# Patient Record
Sex: FEMALE | URBAN - METROPOLITAN AREA
[De-identification: names, ages, dates, MRNs, and addresses within clinical notes are randomized per-mention and may not be internally consistent; named-entity substitution may affect disease eponyms.]

---

## 2017-06-30 ENCOUNTER — HISTORICAL (OUTPATIENT)
Dept: HEPATOLOGY | Facility: HOSPITAL | Age: 67
End: 2017-06-30

## 2017-07-18 ENCOUNTER — HISTORICAL (OUTPATIENT)
Dept: PREADMISSION TESTING | Facility: HOSPITAL | Age: 67
End: 2017-07-18

## 2017-07-18 LAB
BUN SERPL-MCNC: 14 MG/DL (ref 7–18)
CALCIUM SERPL-MCNC: 9 MG/DL (ref 8.5–10.1)
CHLORIDE SERPL-SCNC: 102 MMOL/L (ref 98–107)
CO2 SERPL-SCNC: 31 MMOL/L (ref 21–32)
CREAT SERPL-MCNC: 0.78 MG/DL (ref 0.55–1.02)
EST. AVERAGE GLUCOSE BLD GHB EST-MCNC: 157 MG/DL
GLUCOSE SERPL-MCNC: 119 MG/DL (ref 74–106)
HBA1C MFR BLD: 7.1 % (ref 4.2–6.3)
POTASSIUM SERPL-SCNC: 4.7 MMOL/L (ref 3.5–5.1)
SODIUM SERPL-SCNC: 141 MMOL/L (ref 136–145)

## 2017-07-20 ENCOUNTER — HISTORICAL (OUTPATIENT)
Dept: SURGERY | Facility: HOSPITAL | Age: 67
End: 2017-07-20

## 2017-10-27 ENCOUNTER — HISTORICAL (OUTPATIENT)
Dept: RADIOLOGY | Facility: HOSPITAL | Age: 67
End: 2017-10-27

## 2017-11-30 ENCOUNTER — HISTORICAL (OUTPATIENT)
Dept: ADMINISTRATIVE | Facility: HOSPITAL | Age: 67
End: 2017-11-30

## 2017-11-30 LAB
ALBUMIN SERPL-MCNC: 3.9 GM/DL (ref 3.4–5)
ALBUMIN/GLOB SERPL: 1.2 RATIO (ref 1.1–2)
ALP SERPL-CCNC: 94 UNIT/L (ref 38–126)
ALT SERPL-CCNC: 44 UNIT/L (ref 12–78)
AST SERPL-CCNC: 23 UNIT/L (ref 15–37)
BILIRUB SERPL-MCNC: 0.4 MG/DL (ref 0.2–1)
BILIRUBIN DIRECT+TOT PNL SERPL-MCNC: 0.1 MG/DL (ref 0–0.5)
BILIRUBIN DIRECT+TOT PNL SERPL-MCNC: 0.3 MG/DL (ref 0–0.8)
BUN SERPL-MCNC: 13 MG/DL (ref 7–18)
CALCIUM SERPL-MCNC: 9.2 MG/DL (ref 8.5–10.1)
CHLORIDE SERPL-SCNC: 105 MMOL/L (ref 98–107)
CHOLEST SERPL-MCNC: 207 MG/DL (ref 0–200)
CHOLEST/HDLC SERPL: 4.3 {RATIO} (ref 0–4)
CO2 SERPL-SCNC: 30 MMOL/L (ref 21–32)
CREAT SERPL-MCNC: 0.86 MG/DL (ref 0.55–1.02)
EST. AVERAGE GLUCOSE BLD GHB EST-MCNC: 154 MG/DL
GLOBULIN SER-MCNC: 3.3 GM/DL (ref 2.4–3.5)
GLUCOSE SERPL-MCNC: 160 MG/DL (ref 74–106)
HBA1C MFR BLD: 7 % (ref 4.2–6.3)
HDLC SERPL-MCNC: 48 MG/DL (ref 35–60)
LDLC SERPL CALC-MCNC: 131 MG/DL (ref 0–129)
POTASSIUM SERPL-SCNC: 5.3 MMOL/L (ref 3.5–5.1)
PROT SERPL-MCNC: 7.2 GM/DL (ref 6.4–8.2)
SODIUM SERPL-SCNC: 140 MMOL/L (ref 136–145)
TRIGL SERPL-MCNC: 139 MG/DL (ref 30–150)
VLDLC SERPL CALC-MCNC: 28 MG/DL

## 2018-06-21 ENCOUNTER — HISTORICAL (OUTPATIENT)
Dept: ADMINISTRATIVE | Facility: HOSPITAL | Age: 68
End: 2018-06-21

## 2018-07-11 ENCOUNTER — HISTORICAL (OUTPATIENT)
Dept: RADIOLOGY | Facility: HOSPITAL | Age: 68
End: 2018-07-11

## 2018-08-21 ENCOUNTER — HISTORICAL (OUTPATIENT)
Dept: LAB | Facility: HOSPITAL | Age: 68
End: 2018-08-21

## 2018-08-21 LAB
ABS NEUT (OLG): 4.92 X10(3)/MCL (ref 2.1–9.2)
ALBUMIN SERPL-MCNC: 3.6 GM/DL (ref 3.4–5)
ALBUMIN/GLOB SERPL: 1.2 RATIO (ref 1.1–2)
ALP SERPL-CCNC: 83 UNIT/L (ref 38–126)
ALT SERPL-CCNC: 31 UNIT/L (ref 12–78)
AST SERPL-CCNC: 19 UNIT/L (ref 15–37)
BASOPHILS # BLD AUTO: 0 X10(3)/MCL (ref 0–0.2)
BASOPHILS NFR BLD AUTO: 0 %
BILIRUB SERPL-MCNC: 0.3 MG/DL (ref 0.2–1)
BILIRUBIN DIRECT+TOT PNL SERPL-MCNC: 0.1 MG/DL (ref 0–0.5)
BILIRUBIN DIRECT+TOT PNL SERPL-MCNC: 0.2 MG/DL (ref 0–0.8)
BUN SERPL-MCNC: 15 MG/DL (ref 7–18)
CALCIUM SERPL-MCNC: 9.1 MG/DL (ref 8.5–10.1)
CHLORIDE SERPL-SCNC: 102 MMOL/L (ref 98–107)
CHOLEST SERPL-MCNC: 204 MG/DL (ref 0–200)
CHOLEST/HDLC SERPL: 4.7 {RATIO} (ref 0–4)
CO2 SERPL-SCNC: 32 MMOL/L (ref 21–32)
CREAT SERPL-MCNC: 0.83 MG/DL (ref 0.55–1.02)
CREAT UR-MCNC: 212 MG/DL
EOSINOPHIL # BLD AUTO: 0.2 X10(3)/MCL (ref 0–0.9)
EOSINOPHIL NFR BLD AUTO: 3 %
ERYTHROCYTE [DISTWIDTH] IN BLOOD BY AUTOMATED COUNT: 13.7 % (ref 11.5–17)
EST. AVERAGE GLUCOSE BLD GHB EST-MCNC: 126 MG/DL
GLOBULIN SER-MCNC: 3 GM/DL (ref 2.4–3.5)
GLUCOSE SERPL-MCNC: 155 MG/DL (ref 74–106)
HBA1C MFR BLD: 6 % (ref 4.2–6.3)
HCT VFR BLD AUTO: 37.7 % (ref 37–47)
HDLC SERPL-MCNC: 43 MG/DL (ref 35–60)
HGB BLD-MCNC: 11.9 GM/DL (ref 12–16)
LDLC SERPL CALC-MCNC: 125 MG/DL (ref 0–129)
LYMPHOCYTES # BLD AUTO: 2 X10(3)/MCL (ref 0.6–4.6)
LYMPHOCYTES NFR BLD AUTO: 26 %
MCH RBC QN AUTO: 29.3 PG (ref 27–31)
MCHC RBC AUTO-ENTMCNC: 31.6 GM/DL (ref 33–36)
MCV RBC AUTO: 92.9 FL (ref 80–94)
MICROALBUMIN UR-MCNC: 1.4 MG/DL
MICROALBUMIN/CREAT RATIO PNL UR: 6.6 MG/GM CR (ref 0–30)
MONOCYTES # BLD AUTO: 0.6 X10(3)/MCL (ref 0.1–1.3)
MONOCYTES NFR BLD AUTO: 8 %
NEUTROPHILS # BLD AUTO: 4.92 X10(3)/MCL (ref 1.4–7.9)
NEUTROPHILS NFR BLD AUTO: 63 %
PLATELET # BLD AUTO: 226 X10(3)/MCL (ref 130–400)
PMV BLD AUTO: 10.3 FL (ref 9.4–12.4)
POTASSIUM SERPL-SCNC: 4.8 MMOL/L (ref 3.5–5.1)
PROT SERPL-MCNC: 6.6 GM/DL (ref 6.4–8.2)
RBC # BLD AUTO: 4.06 X10(6)/MCL (ref 4.2–5.4)
SODIUM SERPL-SCNC: 140 MMOL/L (ref 136–145)
TRIGL SERPL-MCNC: 179 MG/DL (ref 30–150)
VLDLC SERPL CALC-MCNC: 36 MG/DL
WBC # SPEC AUTO: 7.9 X10(3)/MCL (ref 4.5–11.5)

## 2019-03-12 ENCOUNTER — HISTORICAL (OUTPATIENT)
Dept: LAB | Facility: HOSPITAL | Age: 69
End: 2019-03-12

## 2019-03-12 LAB
ABS NEUT (OLG): 4.36 X10(3)/MCL (ref 2.1–9.2)
ALBUMIN SERPL-MCNC: 4 GM/DL (ref 3.4–5)
ALBUMIN/GLOB SERPL: 1.1 {RATIO}
ALP SERPL-CCNC: 85 UNIT/L (ref 38–126)
ALT SERPL-CCNC: 28 UNIT/L (ref 12–78)
AST SERPL-CCNC: 21 UNIT/L (ref 15–37)
BASOPHILS # BLD AUTO: 0.1 X10(3)/MCL (ref 0–0.2)
BASOPHILS NFR BLD AUTO: 1 %
BILIRUB SERPL-MCNC: 0.5 MG/DL (ref 0.2–1)
BILIRUBIN DIRECT+TOT PNL SERPL-MCNC: 0.1 MG/DL (ref 0–0.2)
BILIRUBIN DIRECT+TOT PNL SERPL-MCNC: 0.4 MG/DL (ref 0–0.8)
BUN SERPL-MCNC: 15 MG/DL (ref 7–18)
CALCIUM SERPL-MCNC: 9.2 MG/DL (ref 8.5–10.1)
CHLORIDE SERPL-SCNC: 102 MMOL/L (ref 98–107)
CHOLEST SERPL-MCNC: 212 MG/DL (ref 0–200)
CHOLEST/HDLC SERPL: 3.5 {RATIO} (ref 0–4)
CO2 SERPL-SCNC: 32 MMOL/L (ref 21–32)
CREAT SERPL-MCNC: 0.87 MG/DL (ref 0.55–1.02)
DEPRECATED CALCIDIOL+CALCIFEROL SERPL-MC: 25.61 NG/ML (ref 30–80)
EOSINOPHIL # BLD AUTO: 0.2 X10(3)/MCL (ref 0–0.9)
EOSINOPHIL NFR BLD AUTO: 2 %
ERYTHROCYTE [DISTWIDTH] IN BLOOD BY AUTOMATED COUNT: 13.5 % (ref 11.5–17)
EST. AVERAGE GLUCOSE BLD GHB EST-MCNC: 154 MG/DL
GLOBULIN SER-MCNC: 3.6 GM/DL (ref 2.4–3.5)
GLUCOSE SERPL-MCNC: 147 MG/DL (ref 74–106)
HBA1C MFR BLD: 7 % (ref 4.2–6.3)
HCT VFR BLD AUTO: 43.3 % (ref 37–47)
HDLC SERPL-MCNC: 60 MG/DL (ref 35–60)
HGB BLD-MCNC: 13.7 GM/DL (ref 12–16)
LDLC SERPL CALC-MCNC: 130 MG/DL (ref 0–129)
LYMPHOCYTES # BLD AUTO: 2.4 X10(3)/MCL (ref 0.6–4.6)
LYMPHOCYTES NFR BLD AUTO: 31 %
MCH RBC QN AUTO: 29.5 PG (ref 27–31)
MCHC RBC AUTO-ENTMCNC: 31.6 GM/DL (ref 33–36)
MCV RBC AUTO: 93.3 FL (ref 80–94)
MONOCYTES # BLD AUTO: 0.6 X10(3)/MCL (ref 0.1–1.3)
MONOCYTES NFR BLD AUTO: 8 %
NEUTROPHILS # BLD AUTO: 4.36 X10(3)/MCL (ref 2.1–9.2)
NEUTROPHILS NFR BLD AUTO: 58 %
PLATELET # BLD AUTO: 321 X10(3)/MCL (ref 130–400)
PMV BLD AUTO: 10.7 FL (ref 9.4–12.4)
POTASSIUM SERPL-SCNC: 4.3 MMOL/L (ref 3.5–5.1)
PROGEST SERPL-MCNC: <0.1 NG/ML
PROT SERPL-MCNC: 7.6 GM/DL (ref 6.4–8.2)
RBC # BLD AUTO: 4.64 X10(6)/MCL (ref 4.2–5.4)
SODIUM SERPL-SCNC: 139 MMOL/L (ref 136–145)
T3FREE SERPL-MCNC: 2.64 PG/ML (ref 2.18–3.98)
T4 FREE SERPL-MCNC: 0.86 NG/DL (ref 0.76–1.46)
TRIGL SERPL-MCNC: 112 MG/DL (ref 30–150)
TSH SERPL-ACNC: 2.58 MIU/L (ref 0.36–3.74)
VLDLC SERPL CALC-MCNC: 22 MG/DL
WBC # SPEC AUTO: 7.6 X10(3)/MCL (ref 4.5–11.5)

## 2019-08-15 ENCOUNTER — HISTORICAL (OUTPATIENT)
Dept: LAB | Facility: HOSPITAL | Age: 69
End: 2019-08-15

## 2019-08-15 LAB
CREAT UR-MCNC: 144 MG/DL
EST. AVERAGE GLUCOSE BLD GHB EST-MCNC: 137 MG/DL
HBA1C MFR BLD: 6.4 % (ref 4.2–6.3)
MICROALBUMIN UR-MCNC: 1.3 MG/DL
MICROALBUMIN/CREAT RATIO PNL UR: 9 MG/GM CR (ref 0–30)

## 2019-08-21 ENCOUNTER — HISTORICAL (OUTPATIENT)
Dept: CARDIOLOGY | Facility: HOSPITAL | Age: 69
End: 2019-08-21

## 2019-09-13 ENCOUNTER — HISTORICAL (OUTPATIENT)
Dept: LAB | Facility: HOSPITAL | Age: 69
End: 2019-09-13

## 2019-09-13 LAB
CREAT UR-MCNC: 187 MG/DL
MICROALBUMIN UR-MCNC: 1.3 MG/DL
MICROALBUMIN/CREAT RATIO PNL UR: 7 MG/GM CR (ref 0–30)

## 2019-10-02 ENCOUNTER — HISTORICAL (OUTPATIENT)
Dept: ADMINISTRATIVE | Facility: HOSPITAL | Age: 69
End: 2019-10-02

## 2020-02-26 LAB
BUN SERPL-MCNC: 17 MG/DL (ref 7–18)
CALCIUM SERPL-MCNC: 9.2 MG/DL (ref 8.5–10.1)
CHLORIDE SERPL-SCNC: 102 MMOL/L (ref 98–107)
CO2 SERPL-SCNC: 31 MMOL/L (ref 21–32)
CREAT SERPL-MCNC: 0.86 MG/DL (ref 0.55–1.02)
CREAT/UREA NIT SERPL: 20
EST. AVERAGE GLUCOSE BLD GHB EST-MCNC: 154 MG/DL
GLUCOSE SERPL-MCNC: 126 MG/DL (ref 74–106)
HBA1C MFR BLD: 7 % (ref 4.2–6.3)
POTASSIUM SERPL-SCNC: 3.6 MMOL/L (ref 3.5–5.1)
SODIUM SERPL-SCNC: 138 MMOL/L (ref 136–145)

## 2020-03-12 ENCOUNTER — HISTORICAL (OUTPATIENT)
Dept: SURGERY | Facility: HOSPITAL | Age: 70
End: 2020-03-12

## 2021-07-29 ENCOUNTER — HISTORICAL (OUTPATIENT)
Dept: ADMINISTRATIVE | Facility: HOSPITAL | Age: 71
End: 2021-07-29

## 2021-07-29 LAB — SARS-COV-2 AG RESP QL IA.RAPID: NEGATIVE

## 2022-04-10 ENCOUNTER — HISTORICAL (OUTPATIENT)
Dept: ADMINISTRATIVE | Facility: HOSPITAL | Age: 72
End: 2022-04-10

## 2022-04-26 VITALS
WEIGHT: 171.31 LBS | SYSTOLIC BLOOD PRESSURE: 104 MMHG | OXYGEN SATURATION: 97 % | DIASTOLIC BLOOD PRESSURE: 61 MMHG | BODY MASS INDEX: 27.53 KG/M2 | HEIGHT: 66 IN

## 2022-04-30 NOTE — OP NOTE
DATE OF SURGERY:    07/20/2017    SURGEON:  JENNIFER Mancini MD    ASSISTANT:  Mario Mendez, Certified first assistant    PREOPERATIVE DIAGNOSIS:  Right rotator cuff impingement tendonitis with rotator cuff tear.    POSTOPERATIVE DIAGNOSIS:  Right rotator cuff impingement tendonitis with rotator cuff tear.    OPERATIVE PROCEDURE:  Right subacromial decompression with rotator cuff repair.    INDICATION FOR PROCEDURE:  This 66-year-old had the above mentioned problem.  She had failed conservative therapy and desired operative intervention. The risks and benefits of the proposed and alternative treatment were explained to the patient.  Questions were solicited and answered.  No assurance given.  Inform consent was obtained.    PROCEDURE IN DETAIL:  After appropriate operative consents were obtained, patient was brought to the operating room, placed on the operating table in supine position. Preoperatively a scalene block had been introduced without difficulty.  Intraoperatively, general endotracheal anesthesia was induced without difficulty.  The patient's head was placed on a Ruff head rest and bed was placed in a modified Jerome's position.  The skin on the right arm and shoulder were prepped and draped in a sterile manner using ChloraPrep.  The patient had a latex allergy and all latex precautions were followed.  Standard approach to AC joint was done, a stay stitch was placed in the 2% deltoid splitting.  A small amount of deltoid was taken down anterior to the AC joint.  Patient had extremely thickened bursa which was removed.  Patient had a very small subacromial space.  A subacromial decompression was done and the edges were smooth with a Darrach retractor.  Next, rotator cuff was evaluated, patient did have a full thickness simple tear at the supraspinatus infraspinatus junction.  The free edges were debrided.  Next, using the Quattro link knotless anchor 5.5 millimeter Peak cap was used.   The free edges of the cuff were grasped with suture using the Scorpion suture passer.  Tails of the suture were passed through the anchor and the anchor was impacted down to the first line.  The rotator cuff was then tensioned to the appropriate position and the anchor was then impacted the remainder of the way.  This gave an excellent repair of the rotator cuff.  The shoulder was taken through a range of motion, repair was found to be stable in all planes.  The wound was thoroughly irrigated, all bleeders coagulated.  The deltoid was closed and reattached and the wound was then closed in layered fashion with subcuticular stitch on the skin.  Sterile dressings were applied and a compressive dressing was applied.         Lap count, sponge count correct X2.  Estimated blood loss was minimal.  Patient tolerated the procedure without difficulty and was transferred to recovery room in stable condition.        ______________________________  M. MD DEBRA Guillory/OMID  DD:  07/20/2017  Time:  01:56PM  DT:  07/21/2017  Time:  11:09AM  Job #:  [id number]

## 2022-04-30 NOTE — OP NOTE
DATE OF SURGERY:    03/12/2020    SURGEON:  JENNIFER Mancini MD    PREOPERATIVE DIAGNOSIS:  Mucocele, left 3rd finger.    POSTOPERATIVE DIAGNOSIS:  Mucocele, left 3rd finger.  Retained foreign body, left 3rd finger.    OPERATIVE PROCEDURE:  Irrigation and debridement, removal of foreign body and mucocele from left 3rd finger.    INDICATION FOR OPERATION:  This 69-year-old had the above-mentioned problems.  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  She had previously had a DIP fusion by another surgeon.  The patient desired operative intervention for her recurrent problem.  The risks and benefits of the proposed and alternative treatment were explained to the patient.  Questions were solicited and answered.  No assurance given.  Informed consent was obtained.    OPERATION IN DETAIL:  After appropriate operative consents were obtained, patient was taken to the operating room.  Axillary block had been induced by Anesthesia without difficulty.  Skin on the left arm was prepped and draped in a sterile manner using ChloraPrep.  After exsanguination with an Esmarch bandage, a previously placed arm tourniquet was elevated.  There was a small dark punctate hole at the area of her mucocele.  A hockey-stick-shaped incision was made over the DIP joint in this area.  Careful dissection was done.  Mucocele was removed, and I found a retained piece of nylon suture underneath the skin that was the source of the mucocele.  This area was thoroughly cleaned and irrigated.  All bleeders were coagulated, and the wound was closed with interrupted nylon sutures.  Sterile dressings were applied, and a compressive dressing was applied.     Lap count and sponge count were correct x2.  Estimated blood loss was 3 cc.  Patient tolerated the procedure without difficulty and was transferred to One-Day Stay in stable condition.        ______________________________  JENNIFER Mancini MD    DEBRA/NIRAV  DD:  03/12/2020   Time:  07:23AM  DT:  03/12/2020  Time:  07:29AM  Job #:  [id number]

## 2025-06-12 DIAGNOSIS — S32.9XXA PELVIC FRACTURE: Primary | ICD-10-CM

## 2025-06-17 ENCOUNTER — OFFICE VISIT (OUTPATIENT)
Dept: ORTHOPEDICS | Facility: CLINIC | Age: 75
End: 2025-06-17
Payer: MEDICARE

## 2025-06-17 VITALS
HEIGHT: 66 IN | DIASTOLIC BLOOD PRESSURE: 67 MMHG | WEIGHT: 155 LBS | BODY MASS INDEX: 24.91 KG/M2 | HEART RATE: 98 BPM | SYSTOLIC BLOOD PRESSURE: 110 MMHG

## 2025-06-17 DIAGNOSIS — S32.301A CLOSED NONDISPLACED FRACTURE OF RIGHT ILIUM, UNSPECIFIED FRACTURE MORPHOLOGY, INITIAL ENCOUNTER: ICD-10-CM

## 2025-06-17 PROCEDURE — 27197 CLSD TX PELVIC RING FX: CPT | Mod: ,,, | Performed by: ORTHOPAEDIC SURGERY

## 2025-06-17 PROCEDURE — 99203 OFFICE O/P NEW LOW 30 MIN: CPT | Mod: 25,,, | Performed by: ORTHOPAEDIC SURGERY

## 2025-06-17 RX ORDER — DIAPER,BRIEF,ADULT, DISPOSABLE
500 EACH MISCELLANEOUS DAILY
COMMUNITY

## 2025-06-17 RX ORDER — DEXTROAMPHETAMINE SACCHARATE, AMPHETAMINE ASPARTATE, DEXTROAMPHETAMINE SULFATE AND AMPHETAMINE SULFATE 7.5; 7.5; 7.5; 7.5 MG/1; MG/1; MG/1; MG/1
1 TABLET ORAL 2 TIMES DAILY
COMMUNITY

## 2025-06-17 RX ORDER — CLONIDINE HYDROCHLORIDE 0.1 MG/1
TABLET ORAL
COMMUNITY
Start: 2025-04-16

## 2025-06-17 RX ORDER — TRAZODONE HYDROCHLORIDE 50 MG/1
100 TABLET ORAL NIGHTLY
COMMUNITY
Start: 2025-04-16

## 2025-06-17 RX ORDER — SEMAGLUTIDE 1.34 MG/ML
INJECTION, SOLUTION SUBCUTANEOUS
COMMUNITY

## 2025-06-17 RX ORDER — GABAPENTIN 100 MG/1
200 CAPSULE ORAL 2 TIMES DAILY
COMMUNITY

## 2025-06-17 RX ORDER — MONTELUKAST SODIUM 10 MG/1
10 TABLET ORAL
COMMUNITY

## 2025-06-17 RX ORDER — GLIMEPIRIDE 4 MG/1
4 TABLET ORAL 2 TIMES DAILY
COMMUNITY

## 2025-06-17 RX ORDER — CITALOPRAM 40 MG/1
40 TABLET ORAL
COMMUNITY

## 2025-06-17 RX ORDER — BUPROPION HYDROCHLORIDE 300 MG/1
300 TABLET ORAL
COMMUNITY

## 2025-06-17 RX ORDER — LOSARTAN POTASSIUM 100 MG/1
100 TABLET ORAL
COMMUNITY

## 2025-06-17 RX ORDER — ATORVASTATIN CALCIUM 10 MG/1
10 TABLET, FILM COATED ORAL DAILY
COMMUNITY

## 2025-06-17 RX ORDER — ARIPIPRAZOLE 5 MG/1
5 TABLET ORAL
COMMUNITY

## 2025-06-17 RX ORDER — ACETAMINOPHEN, DEXTROMETHORPHAN HYDROBROMIDE, DOXYLAMINE SUCCINATE, PHENYLEPHRINE HYDROCHLORIDE 650; 20; 12.5; 1 MG/30ML; MG/30ML; MG/30ML; MG/30ML
SOLUTION ORAL
COMMUNITY

## 2025-06-17 NOTE — PROGRESS NOTES
"Subjective:       Patient ID: Terese Nieves is a 74 y.o. female.  Chief Complaint   Patient presents with    Pelvis - Injury     Pelvis fx, DOI: 6/2/25, at the top bunk coming down the ladder fell backwards, present with her sister       HPI:  History of Present Illness    HPI:  Ms. Nieves presents for follow-up after a recent fall resulting in pelvic fractures. She reports pain in the front of her pelvis, describing it as severe but not at maximum intensity. Pain is exacerbated by walking. She is currently using a cane for ambulation and has a wheelchair available. She denies any subsequent falls since the initial incident or pain in the front of her pelvis other than the main area of concern.    She also reports pain in both shoulders, which she attributes to the impact of the fall. Pain occurs when reaching for objects.    For pain management, she is currently taking Norco (hydrocodone).    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    Her medical history is significant for extensive back surgery in 2002, which included a "global fusion" with incisions both anteriorly and posteriorly.    IMAGING:  A CT of the pelvis confirmed fractures. The radiologist identified two fractures, with a possible third. The orthopedic surgeon confirmed one definite fracture of the inferior pubic ramus.    MEDICATIONS:  Ms. Nieves is on [place] (hydrocodone) for pain management.    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 She is currently concerned about her ability to return to work due to a recent injury. This provider believes the patient will be able to return to teaching by the new year, though she may be slower initially. She is expected to have noticeable improvement in 6 weeks, allowing her to return to work.      ROS:  Genitourinary: +pelvic pain  Musculoskeletal: +joint pain, +pain with " "movement, +difficulty walking          ROS:  Constitutional: Denies fever chills  Eyes: No change in vision  ENT: No ringing or current infections  CV: No chest pain  Resp: No labored breathing  MSK: Pain evident at site of injury located in HPI,   Integ: No signs of abrasions or lacerations  Neuro: No numbness or tingling  Lymphatic: No swelling outside the area of injury     Medications Ordered Prior to Encounter[1]       Objective:      /67 (BP Location: Left arm, Patient Position: Sitting)   Pulse 98   Ht 5' 6" (1.676 m)   Wt 70.3 kg (155 lb)   BMI 25.02 kg/m²   General the patient is alert and oriented x3 no acute distress nontoxic-appearing appropriate affect.    Constitutional: Vital signs are examined and stable.  Resp: No signs of labored breathing                        RLE: -Skin:No signs of new abrasions or lacerations, no scars           -MSK: : EHL/FHL, Gastroc/Tib anterior Strength 5/5           -Neuro:  Sensation intact to light touch L3-S1 dermatomes           -Lymphatic: No signs of lymphadenopathy           -CV: Capillary refill is less than 2 seconds. DP/PT pulses  2/4. Compartments soft and compressible.   Body mass index is 25.02 kg/m².  Ideal body weight: 59.3 kg (130 lb 11.7 oz)  Adjusted ideal body weight: 63.7 kg (140 lb 7 oz)  Hemoglobin A1c   Date Value Ref Range Status   02/26/2020 7.0 (H) 4.2 - 6.3 % Final   08/15/2019 6.4 (H) 4.2 - 6.3 % Final     Hgb   Date Value Ref Range Status   03/12/2019 13.7 12.0 - 16.0 gm/dL Final   08/21/2018 11.9 (L) 12.0 - 16.0 gm/dL Final     Vitamin D   Date Value Ref Range Status   03/12/2019 25.61 (L) 30.00 - 80.00 ng/mL Final     WBC   Date Value Ref Range Status   03/12/2019 7.6 4.5 - 11.5 x10(3)/mcL Final   08/21/2018 7.9 4.5 - 11.5 x10(3)/mcL Final       Radiology:  CT pelvis showing a right LC 1 pelvic ring fracture minimal displacement.  X-rays today show minimal displacement of the pelvic ring fracture        Assessment:         1. " "Pelvic fracture  Ambulatory referral/consult to Orthopedics    Ambulatory Referral/Consult to Physical Therapy              Plan:         No follow-ups on file.    Terese Bentley" was seen today for injury.    Diagnoses and all orders for this visit:    Pelvic fracture  -     Ambulatory referral/consult to Orthopedics  -     Ambulatory Referral/Consult to Physical Therapy; Future      Assessment & Plan    PLAN SUMMARY:  - XR ordered to check healing progress  - Referred to physical therapy  - Continue weight bearing as tolerated  - Norco (hydrocodone) prescribed for pain for 2 weeks  - Use cane for walking assistance if needed  - Avoid excessive wheelchair use  - Follow-up in a few weeks for follow-up and XRs, with ~2 more total visits planned    FOLLOW UP:  - Follow up in a few weeks for follow-up and XRs.  - Follow up ~2 more times total.    MEDICATIONS:  - Norco (hydrocodone) for pain for a few weeks.    REFERRALS:  - Referred to PT.    IMAGING:  - Ordered XR for follow-up visit to check healing progress.    PATIENT INSTRUCTIONS:  - Continue weight bearing as tolerated.  - Use cane for walking assistance if needed.  - Avoid using wheelchair excessively.          Patient has been LC 1 pelvic ring fracture small sacral ala fracture of the right.  She is ambulatory at home.  She is taking care of herself.  She teaches math and care ingrowth.  She is weight-bearing as tolerated.  We will order her pain medication when she calls.  Suspect she will make a full recovery without surgery.  We will order fracture care today.      This note/OR report was created with the assistance of  voice recognition software or phone  dictation.  There may be transcription errors as a result of using this technology however minimal. Effort has been made to assure accuracy of transcription but any obvious errors or omissions should be clarified with the author of the document.     This note was generated with the assistance of Beegit " listening technology. Verbal consent was obtained by the patient and accompanying visitor(s) for the recording of patient appointment to facilitate this note. I attest to having reviewed and edited the generated note for accuracy, though some syntax or spelling errors may persist. Please contact the author of this note for any clarification.       Tyson Lewis DO  Orthopedic Trauma Surgery  06/17/2025      Future Appointments   Date Time Provider Department Center   7/9/2025  9:45 AM Tyson Lewis DO [facility] [place]                   [1]   Current Outpatient Medications on File Prior to Visit   Medication Sig Dispense Refill    ARIPiprazole (ABILIFY) 5 MG Tab Take 5 mg by mouth.      atorvastatin (LIPITOR) 10 MG tablet Take 10 mg by mouth once daily.      buPROPion (WELLBUTRIN XL) 300 MG 24 hr tablet Take 300 mg by mouth.      citalopram (CELEXA) 40 MG tablet Take 40 mg by mouth.      cloNIDine (CATAPRES) 0.1 MG tablet 1-2 q hs      dextroamphetamine-amphetamine 30 mg Tab Take 1 tablet by mouth 2 (two) times daily.      gabapentin (NEURONTIN) 100 MG capsule Take 200 mg by mouth 2 (two) times daily.      glimepiride (AMARYL) 4 MG tablet Take 4 mg by mouth 2 (two) times daily.      Lactobacillus rhamnosus GG (CULTURELLE) 10 billion cell capsule Take 1 capsule by mouth once daily.      losartan (COZAAR) 100 MG tablet Take 100 mg by mouth.      lysine (L-LYSINE) 500 mg Tab Take 500 mg by mouth once daily.      montelukast (SINGULAIR) 10 mg tablet Take 10 mg by mouth.      semaglutide (OZEMPIC) 1 mg/dose (4 mg/3 mL) Inject into the skin every 7 days.      traZODone (DESYREL) 50 MG tablet Take 100 mg by mouth every evening.      turmeric root extract 1,053 mg Tab Take by mouth.       No current facility-administered medications on file prior to visit.

## 2025-06-20 DIAGNOSIS — S32.301A CLOSED NONDISPLACED FRACTURE OF RIGHT ILIUM, UNSPECIFIED FRACTURE MORPHOLOGY, INITIAL ENCOUNTER: Primary | ICD-10-CM

## 2025-06-20 RX ORDER — HYDROCODONE BITARTRATE AND ACETAMINOPHEN 10; 325 MG/1; MG/1
1 TABLET ORAL EVERY 6 HOURS PRN
Qty: 28 TABLET | Refills: 0 | Status: SHIPPED | OUTPATIENT
Start: 2025-06-20 | End: 2025-06-27

## 2025-07-03 DIAGNOSIS — S32.301A CLOSED NONDISPLACED FRACTURE OF RIGHT ILIUM, UNSPECIFIED FRACTURE MORPHOLOGY, INITIAL ENCOUNTER: ICD-10-CM

## 2025-07-03 RX ORDER — HYDROCODONE BITARTRATE AND ACETAMINOPHEN 10; 325 MG/1; MG/1
1 TABLET ORAL EVERY 8 HOURS PRN
Qty: 21 TABLET | Refills: 0 | Status: SHIPPED | OUTPATIENT
Start: 2025-07-03 | End: 2025-07-10

## 2025-07-09 ENCOUNTER — HOSPITAL ENCOUNTER (OUTPATIENT)
Dept: RADIOLOGY | Facility: CLINIC | Age: 75
Discharge: HOME | End: 2025-07-09
Attending: ORTHOPAEDIC SURGERY
Payer: MEDICARE

## 2025-07-09 ENCOUNTER — OFFICE VISIT (OUTPATIENT)
Dept: ORTHOPEDICS | Facility: CLINIC | Age: 75
End: 2025-07-09
Payer: MEDICARE

## 2025-07-09 VITALS
BODY MASS INDEX: 24.91 KG/M2 | WEIGHT: 155 LBS | SYSTOLIC BLOOD PRESSURE: 122 MMHG | HEIGHT: 66 IN | HEART RATE: 76 BPM | DIASTOLIC BLOOD PRESSURE: 86 MMHG

## 2025-07-09 DIAGNOSIS — S32.301A CLOSED NONDISPLACED FRACTURE OF RIGHT ILIUM, UNSPECIFIED FRACTURE MORPHOLOGY, INITIAL ENCOUNTER: ICD-10-CM

## 2025-07-09 DIAGNOSIS — S32.301A CLOSED NONDISPLACED FRACTURE OF RIGHT ILIUM, UNSPECIFIED FRACTURE MORPHOLOGY, INITIAL ENCOUNTER: Primary | ICD-10-CM

## 2025-07-09 PROCEDURE — 99024 POSTOP FOLLOW-UP VISIT: CPT | Mod: POP,,, | Performed by: PHYSICIAN ASSISTANT

## 2025-07-09 PROCEDURE — 72190 X-RAY EXAM OF PELVIS: CPT | Mod: ,,, | Performed by: ORTHOPAEDIC SURGERY

## 2025-07-09 NOTE — PROGRESS NOTES
"  Subjective:       Patient ID: Terese Nieves is a 74 y.o. female.  Chief Complaint   Patient presents with    Pelvis - Follow-up      3 wks s/p pelvic fx fx care 6/17/25- gl 9/23/25, wbat, ambulates without assistance, reports just d/c from cane three days ago, still having moderate to minimal pain, improvement with mobility and rom,         HPI    Patient presents for 3 week follow up non op LC1 pelvic ring. She is ambulatory without assistance today. Discontinued cane use a few days ago. Still having moderate pain mostly in the groin. Otherwise doing well. Pain improving and mobility improving. She has no numbness or tingling distally to the BLE.     ROS:  Constitutional: Denies fever chills  Eyes: No change in vision  ENT: No ringing or current infections  CV: No chest pain  Resp: No labored breathing  MSK: Pain evident at site of injury located in HPI,   Integ: No signs of abrasions or lacerations  Neuro: No numbness or tingling  Lymphatic: No swelling outside the area of injury     Medications Ordered Prior to Encounter[1]       Objective:      /86   Pulse 76   Ht 5' 6" (1.676 m)   Wt 70.3 kg (154 lb 15.7 oz)   BMI 25.01 kg/m²   Physical Exam  General the patient is alert and oriented x3 no acute distress nontoxic-appearing appropriate affect.    Constitutional: Vital signs are examined and stable.  Resp: No signs of labored breathing    LLE: -Skin:  No signs of new abrasions or lacerations, no scars           -MSK: Hip and Knee F/E, EHL/FHL, Gastroc/Tib anterior Strength 5/5           -Neuro:  Sensation intact to light touch L3-S1 dermatomes           -Lymphatic: No signs of lymphadenopathy           -CV: Capillary refill is less than 2 seconds.                       RLE: -Skin: No signs of new abrasions or lacerations, no scars           -MSK: : Hip and Knee F/E, EHL/FHL, Gastroc/Tib anterior Strength 5/5           -Neuro:  Sensation intact to light touch L3-S1 dermatomes           -Lymphatic: " "No signs of lymphadenopathy           -CV: Capillary refill is less than 2 seconds.        Body mass index is 25.01 kg/m².  Ideal body weight: 59.3 kg (130 lb 11.7 oz)  Adjusted ideal body weight: 63.7 kg (140 lb 6.9 oz)  Hemoglobin A1c   Date Value Ref Range Status   02/26/2020 7.0 (H) 4.2 - 6.3 % Final   08/15/2019 6.4 (H) 4.2 - 6.3 % Final     Hgb   Date Value Ref Range Status   03/12/2019 13.7 12.0 - 16.0 gm/dL Final   08/21/2018 11.9 (L) 12.0 - 16.0 gm/dL Final     Hct   Date Value Ref Range Status   03/12/2019 43.3 37.0 - 47.0 % Final   08/21/2018 37.7 37.0 - 47.0 % Final     No results found for: "IRON"  No components found for: "FROLATE"  Vitamin D   Date Value Ref Range Status   03/12/2019 25.61 (L) 30.00 - 80.00 ng/mL Final     WBC   Date Value Ref Range Status   03/12/2019 7.6 4.5 - 11.5 x10(3)/mcL Final   08/21/2018 7.9 4.5 - 11.5 x10(3)/mcL Final       Radiology: 3 view x ray pelvis: stable pelvic ring. No displacement of superior or inferior rami fractures. No consolidation as expected.         Assessment:         1. Closed nondisplaced fracture of right ilium, unspecified fracture morphology, initial encounter  X-Ray Pelvis Complete min 3 views              Plan:         Follow up in about 6 weeks (around 8/20/2025).    Terese Bentley" was seen today for follow-up.    Diagnoses and all orders for this visit:    Closed nondisplaced fracture of right ilium, unspecified fracture morphology, initial encounter  -     X-Ray Pelvis Complete min 3 views; Future        -continue working with PT. Okay to drive as she is safely ambulating without assistance.   - pelvic ring remains stable at this time. Will continue to monitor healing. Remains amenable to non operative treatment.   -Follow up in 4-6 weeks for repeat x rays and eval.  -ED precautions given    The above findings, diagnostics, and treatment plan were discussed with Dr. Lewis who is in agreement with the plan of care except as stated in additional " documentation.       Renate Up PA-C          Future Appointments   Date Time Provider Department Center   8/20/2025  8:00 AM [facility]                  [[facility]   Current Outpatient Medications on File Prior to Visit   Medication Sig Dispense Refill    ARIPiprazole (ABILIFY) 5 MG Tab Take 5 mg by mouth.      atorvastatin (LIPITOR) 10 MG tablet Take 10 mg by mouth once daily.      buPROPion (WELLBUTRIN XL) 300 MG 24 hr tablet Take 300 mg by mouth.      citalopram (CELEXA) 40 MG tablet Take 40 mg by mouth.      cloNIDine (CATAPRES) 0.1 MG tablet 1-2 q hs      dextroamphetamine-amphetamine 30 mg Tab Take 1 tablet by mouth 2 (two) times daily.      gabapentin (NEURONTIN) 100 MG capsule Take 200 mg by mouth 2 (two) times daily.      glimepiride (AMARYL) 4 MG tablet Take 4 mg by mouth 2 (two) times daily.      HYDROcodone-acetaminophen (NORCO)  mg per tablet Take 1 tablet by mouth every 8 (eight) hours as needed for Pain. 21 tablet 0    Lactobacillus rhamnosus GG (CULTURELLE) 10 billion cell capsule Take 1 capsule by mouth once daily.      losartan (COZAAR) 100 MG tablet Take 100 mg by mouth.      lysine (L-LYSINE) 500 mg Tab Take 500 mg by mouth once daily.      montelukast (SINGULAIR) 10 mg tablet Take 10 mg by mouth.      traZODone (DESYREL) 50 MG tablet Take 100 mg by mouth every evening.      turmeric root extract 1,053 mg Tab Take by mouth.      semaglutide (OZEMPIC) 1 mg/dose (4 mg/3 mL) Inject into the skin every 7 days.       No current facility-administered medications on file prior to visit.

## 2025-07-16 DIAGNOSIS — S32.301A CLOSED NONDISPLACED FRACTURE OF RIGHT ILIUM, UNSPECIFIED FRACTURE MORPHOLOGY, INITIAL ENCOUNTER: ICD-10-CM

## 2025-07-16 RX ORDER — HYDROCODONE BITARTRATE AND ACETAMINOPHEN 10; 325 MG/1; MG/1
1 TABLET ORAL EVERY 8 HOURS PRN
Qty: 21 TABLET | Refills: 0 | Status: SHIPPED | OUTPATIENT
Start: 2025-07-16 | End: 2025-07-23